# Patient Record
Sex: MALE | Race: WHITE | Employment: FULL TIME | URBAN - METROPOLITAN AREA
[De-identification: names, ages, dates, MRNs, and addresses within clinical notes are randomized per-mention and may not be internally consistent; named-entity substitution may affect disease eponyms.]

---

## 2023-01-23 ENCOUNTER — HOSPITAL ENCOUNTER (OUTPATIENT)
Dept: GENERAL RADIOLOGY | Age: 53
Discharge: HOME | End: 2023-01-23
Payer: COMMERCIAL

## 2023-01-23 ENCOUNTER — HOSPITAL ENCOUNTER (OUTPATIENT)
Age: 53
Discharge: HOME | End: 2023-01-23

## 2023-01-23 DIAGNOSIS — S16.1XXA STRAIN OF NECK MUSCLE, INITIAL ENCOUNTER: ICD-10-CM

## 2023-01-23 DIAGNOSIS — S46.911A STRAIN OF RIGHT SHOULDER, INITIAL ENCOUNTER: ICD-10-CM

## 2023-01-23 DIAGNOSIS — S80.11XA CONTUSION OF KNEE AND LOWER LEG, RIGHT, INITIAL ENCOUNTER: ICD-10-CM

## 2023-01-23 DIAGNOSIS — S50.02XA CONTUSION OF LEFT ELBOW, INITIAL ENCOUNTER: ICD-10-CM

## 2023-01-23 DIAGNOSIS — S80.01XA CONTUSION OF KNEE AND LOWER LEG, RIGHT, INITIAL ENCOUNTER: ICD-10-CM

## 2023-01-23 DIAGNOSIS — S20.212A BRUISED RIB, LEFT, INITIAL ENCOUNTER: ICD-10-CM

## 2023-01-23 PROCEDURE — 73560 X-RAY EXAM OF KNEE 1 OR 2: CPT

## 2023-01-23 PROCEDURE — 73030 X-RAY EXAM OF SHOULDER: CPT

## 2023-01-23 PROCEDURE — 73080 X-RAY EXAM OF ELBOW: CPT

## 2023-02-02 ENCOUNTER — HOSPITAL ENCOUNTER (OUTPATIENT)
Dept: PHYSICAL THERAPY | Age: 53
Setting detail: THERAPIES SERIES
Discharge: HOME | End: 2023-02-02
Payer: COMMERCIAL

## 2023-02-02 PROCEDURE — 97112 NEUROMUSCULAR REEDUCATION: CPT

## 2023-02-02 PROCEDURE — 97162 PT EVAL MOD COMPLEX 30 MIN: CPT

## 2023-02-02 NOTE — FLOWSHEET NOTE
[place]  [address]. and Therapy, [facility], [facility] [place]. [address] [facility], [address]  Phone: [phone]  Fax [phone]    Physical Therapy Daily Treatment Note    Date:  2023    Patient Name:  Alcira Jackson    :  1970  MRN: [id number]  Medical Diagnosis: Strain of muscle, fascia and tendon at neck level, initial encounter [S16. 1XXA]  Contusion of right knee, initial encounter [S80.01XA]  Treatment Diagnosis:  pain, decreased mobility  Onset Date: 23                 Referral Date: 2023           Referring Provider: Rema Dacosta MD   Insurance Provider: [facility]   (8 visits through 23)  Restrictions/Precautions:   none  Plan of care signed (Y/N):  sent  Visit# / total visits:       G-Code (if applicable):          NDI     Time in:   9:45      Timed Treatment: 10  Total Treatment Time:  45  Time out: 10:30  ________________________________________________________________________________________    Pain Level:    /10  SUBJECTIVE:  see eval    OBJECTIVE:     Exercise/Equipment Resistance/Repetitions Other comments                 Postural re-ed 10 min                                                                                                                         Other Therapeutic Activities:      Manual Treatments:         Modalities:      Test/Measurements:  see eval       ASSESSMENT:     see eval    Treatment/Activity Tolerance:   [x]Patient tolerated treatment well [] Patient limited by fatique  []Patient limited by pain [] Patient limited by other medical complications  [] Other:     Goals:    Short term goal 1: Pt will be indep in HEP  Short term goal 2: Pt will decrease pain 25%  Short term goal 3: Pt will increase cervical ROM to WNL  Short term goal 4: Pt will improved seated posture to WNL via plum line  Short term goal 5: Pt will return to light duty at work without limitation    Plan: [x] Continue per plan of care [] Alter current plan (see comments)   [x] Plan of care initiated [] Hold pending MD visit [] Discharge      Plan for Next Session:  Biomechanical correction of problems as they present. Facilitate correct muscle firing patterns and activation with appropriate activities. Progress patient as tolerated. Re-Certification Due Date:         Signature:  Odilon Garcia PT     CPT Code # of charges Time In Time Out Total Time   EVAL:MODERATE (03435) 1 9:45 10:20 35   Re-Eval       MV(46978)        NMR (48031) 1 10:20 10:30 10   Manual (63085)       TA (36389)       Gait (07907)       E-stim (un) (03375)                  E-stim (attended) (12843)       Devota Blizzard       [facility] [employer] (92386)             Other:         Totals   2 9:45 10:30  45

## 2023-02-02 NOTE — THERAPY EVALUATION
[address] [place] [address] and [place] [address] [address], [address]   Phone: [phone]  Fax [phone]     Dear Referring Practitioner: Dr. John Abdullahi,     We had the pleasure of evaluating the following patient for physical therapy services at [facility]. A summary of our findings can be found in the initial assessment below. This includes our plan of care. If you have any questions or concerns regarding these findings, please do not hesitate to contact me at the office phone number. Thank you for the referral.         Physician Signature:_______________________________Date:__________________  By signing above (or electronic signature), therapists plan is approved by physician      CERVICAL, THORACIC SPINE, AND UPPER EXTREMITY PHYSICAL THERAPY EVALUATION        Evaluation Date: 2/2/2023   Patient Name: Jocelyne Khan   YOB: 1970    Medical Diagnosis: Strain of muscle, fascia and tendon at neck level, initial encounter [S16. 1XXA]  Contusion of right knee, initial encounter [S80.01XA]  Treatment Diagnosis:  pain, decreased mobility  Onset Date: 1/23/23    Referral Date: 2/2/2023   Referring Provider: Deidre Richard MD   Insurance Provider: [facility]   (8 visits through 2/26/23)  Restrictions/Precautions:   none    SUBJECTIVE FINDINGS    History of Present Illness:      Pt presents to physical therapy with c/o neck and back pain s/p fall at work. Notes that he fell on a snow covered ramp and hit a small retaining wall and make-shift step. Fall was on Jan 23rd. Notes that he does feel like he is getting better but slowly. Notes that most pain is in the center of his back. Also notes neck pain out to shoulders and lower back pain when he wakes. Denies N&T, weakness. Denies HA, dizziness, nausea, vomiting, changes in vision. Prior to his fall, no issues with back pain.  Currently on restrictions to do sit down job but still needs to get up and move. Turning in his sleep will wake pt up. 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 Denies imaging done on his spine. Pt does note R knee pain as well that came from the fall.   Current Functional Limitations: work, standing or sitting for increased time  PLOF: indep in ADLs, working full-time, riding bikes, walking  Medical History: DM II, anxiety, depression, L TKR, L shoulder scope    Pain    Patient describes pain to be   Patient reports 4/10 pain at present, 7/10 pain at its worst  Worsened by sitting straight up, standing still  Improved by movement, Advil/Tylenol    [address] [facility]   []   Rule does not apply if any of the following are present:     -No Neck pain  -Unstable Vital Signs     -Non Trauma   -Known vertebral disease      -GCS <15   -Acute Paralysis   -Age <16  -Previous C-Spine Surgery     [x]   Actively rotates head to 45 degrees bilat       Posture    [x]   Forward head  [x]   Forward flexed trunk   [x]   Pronounced CT junction    [x]   Increased thoracic kyphosis   [x]   Increased lumbar lordosis   []   Scoliosis   []   Swayback  []   Scapular winging:  []   Other:   anterior humeral head positioning in glenoid    Palpation/Tenderness/Visual Inspection      Patient reported tenderness with palpation  [x]   Yes   []   No   []   NT  Location:  thoracic paraspinals, thoracic and lumbar SPs  PT notes increased muscle tone   []   Yes   [x]   No   []   NT  Location:   Overhead mobility: forward head, decreased upper thoracic extension, scapular dyskinesia     Special Tests- Cervical and Thoracic   Special Test Findings Comments   Sharp Teena [x]Neg   []Pos   []NT    Alar ligament/ C2 spinous kick test [x]Neg   []Pos   []NT    Vertebral Artery/Positional Provocative Testing [x]Neg   []Pos R   []Pos L   []NT    Dizziness, Nausea, Double Vision [x]Neg   []Pos R   []Pos L Spurling's/Foraminal []Neg   []Pos R   []Pos L   [x]NT    Cranial Nerves [x]Neg   []Pos R   []Pos L    Cervical Distraction [x]Relief noted   []No relief noted   []NT    Cervical Compression  []Neg   []Pos   [x]NT    Median nerve tension test []Neg   []Pos R   []Pos L   [x]NT    Radial nerve tension test []Neg   []Pos R   []Pos L   [x]NT    Ulnar nerve tension test []Neg   []Pos R   []Pos L   [x]NT        Range of Motion/Strength Testing/Myotomes    [x] All ROM and strength WNL except as marked below  *denotes pain  Range Tested AROM Strength (MMT)/Myotomes    Left (or  neutral)  Right Left (or neutral) Right   Cervical Flex (C1-2) WFL      Cervical Ext 75%      Cervical SB (C3) 50% 50%     Cervical Rot 50% 75%     Shoulder Flex   4* 4*   Shoulder Abd (C5)   5 5   Shoulder IR   5 5   Shoulder ER    5 5   Elbow flex (C6)   5 5   Elbow ext (C7)   5 5   Wrist ext (C6)       Wrist flex (C7)       Thumb Ext (C8)         UE Dermatomes     [x] All dermatomes WNL except as marked below   Dermatomes  Left  Right Comments   Posterior Head (C2)      Lateral Upper Neck (C3)      Supraclavicular (C4)      Lateral Upper Arm (C5)      Lateral Forearm/1st (C6)      3rd digit (C7)      5th digit (C8)      Medial Arm (T1)        Reflexes      [x] All reflexes WNL except as marked below  Reflex Left Right   Biceps (C5, C6)     Brachioradialis (C6)     Triceps (C7)     Hoffmans - -   Clonus - -     Scapula Strength     [x] All strength WNL except as marked below   Scapula Left Right Comments   Middle Trapezius 4- 4-    Lower Trapezius 3- 3-    Rhomboid 4 4    Serratus Anterior   3+ 3+    Latissimus Dorsi        Flexibility   [x] All flexibility WNL except as marked below     Muscle Findings   Pectoralis Minor [] WNL   [x] Tight    Pec Major - Lower [] WNL   [x] Tight   Pec Major - Upper [] WNL   [x] Tight   Latissimus Dorsi  [] WNL   [x] Tight   Levator Scapula [] WNL   [] Tight   Suboccipitals [] WNL   [] Tight   Upper Trapezius [] WNL   [x] Tight   Scalenes [] WNL   [] Tight     Joint Mobility/Accessory Movements (cervical, UE, rib)    1st rib - WFL B  Thoracic - hypomobile with PA joint play T1-4  CTJ - hypomobile with PA and rotational joint play  Hip - hypomobility noted with inferior and lateral joint play RLE  Lumbar - assess nv    ASSESSMENT  Pt is a 45 y/o presenting to physical therapy with c/o cervical, thoracic, lumbar and R knee pains. Through evaluation and examination, identified findings consistent with altered joint mechanics of the CTJ, upper thoracic and lumbar spine as well as the R hip. Pt also demonstrated poor movement patterns of overhead mobility and cervical ROM. PT recommending skilled physical therapy in order to address goals and return to function. Body Structures, Functions, Activity Limitations Requiring Skilled Therapeutic Intervention: Decreased functional mobility ,Decreased ADL status,Decreased ROM,Decreased body mechanics,Decreased strength,Increased pain     Statement of Medical Necessity: Physical Therapy is both indicated and medically necessary as outlined in the POC to increase the likelihood of meeting the functionally related goals stated below.       Eval Complexity:    Decision Making: MOD Complexity     PLAN OF CARE    Frequency: 2x/wk for 4 weeks  Current Treatment Recommendations: Therapeutic exercise, therapeutic activity, manual therapy, gait training, neuromuscular re-education     FUNCTIONAL OUTCOME MEASURE  NDI 23/50      GOALS  Short term goal 1: Pt will be indep in HEP  Short term goal 2: Pt will decrease pain 25%  Short term goal 3: Pt will increase cervical ROM to WNL  Short term goal 4: Pt will improved seated posture to WNL via plum line  Short term goal 5: Pt will return to light duty at work without limitation      Time In: 9:45  Time Out: 10:30  Timed Code Treatment Minutes: 10 minutes            Total Treatment Time:  45 minutes    Carolann Favre, PT DPT license # [id number]

## 2023-02-06 ENCOUNTER — HOSPITAL ENCOUNTER (OUTPATIENT)
Dept: PHYSICAL THERAPY | Age: 53
Setting detail: THERAPIES SERIES
Discharge: HOME | End: 2023-02-06
Payer: COMMERCIAL

## 2023-02-06 NOTE — PROGRESS NOTES
Physical Therapy  Cancellation/No-show Note  Patient Name:  Lorelei Cid  :  1970   Date:  2023  Cancels to date: 1  No-shows to date: 0    For today's appointment patient:  [x] Cancelled  [] Rescheduled appointment  [] No-show     Reason given by patient:  [x] Patient ill  [] Conflicting appointment  [] No transportation    [] Conflict with work  [] No reason given  [] Other:     Comments:      Electronically signed by:  Susan Gregory PT

## 2023-02-08 ENCOUNTER — HOSPITAL ENCOUNTER (OUTPATIENT)
Age: 53
Discharge: HOME | End: 2023-02-08

## 2023-02-08 ENCOUNTER — HOSPITAL ENCOUNTER (OUTPATIENT)
Dept: GENERAL RADIOLOGY | Age: 53
Discharge: HOME | End: 2023-02-08
Payer: COMMERCIAL

## 2023-02-08 DIAGNOSIS — S80.11XA CONTUSION OF KNEE AND LOWER LEG, RIGHT, INITIAL ENCOUNTER: ICD-10-CM

## 2023-02-08 DIAGNOSIS — S80.01XA CONTUSION OF KNEE AND LOWER LEG, RIGHT, INITIAL ENCOUNTER: ICD-10-CM

## 2023-02-08 PROCEDURE — 73564 X-RAY EXAM KNEE 4 OR MORE: CPT

## 2023-02-13 ENCOUNTER — HOSPITAL ENCOUNTER (OUTPATIENT)
Dept: PHYSICAL THERAPY | Age: 53
Setting detail: THERAPIES SERIES
Discharge: HOME | End: 2023-02-13
Payer: COMMERCIAL

## 2023-02-13 PROCEDURE — 97140 MANUAL THERAPY 1/> REGIONS: CPT

## 2023-02-13 PROCEDURE — 97110 THERAPEUTIC EXERCISES: CPT

## 2023-02-13 NOTE — FLOWSHEET NOTE
[facility], [facility], [address], [address]  Phone: [phone]  Fax [phone]    Physical Therapy Daily Treatment Note    Date:  2023    Patient Name:  Maritza Gallegos    :  1970  MRN: [id number]  Medical Diagnosis: Strain of muscle, fascia and tendon at neck level, initial encounter [S16. 1XXA]  Contusion of right knee, initial encounter [S80.01XA]  Treatment Diagnosis:  pain, decreased mobility  Onset Date: 23                 Referral Date: 2023           Referring Provider: Pushpa Bernabe MD   Insurance Provider: [facility]   (8 visits through 23)  Restrictions/Precautions:   none  Plan of care signed (Y/N):  sent  Visit# / total visits:       G-Code (if applicable):          NDI     Time in:   7:00     Timed Treatment: 42  Total Treatment Time:  42  Time out: 7:42  ________________________________________________________________________________________    Pain Level:    /10  SUBJECTIVE:  Pt reports that he is stiff. Notes that he still feels like it is getting better. OBJECTIVE: [id number]    Exercise/Equipment Resistance/Repetitions Other comments                 Postural re-ed    SNF 10x10\" hold    Supine cervical rotation X10 B    Supine scap squeeze 10x5\" hold    Supine S HABD x15 Blue TB   Foam roll protocol 6 min                                                                                      Other Therapeutic Activities:      Manual Treatments:    cervical distraction. Supine thoracic PA mobilization grade IV. Supine CTJ rotation mobilization grade IV. Suboccipital release. STM to B upper quarter. Modalities:      Test/Measurements:  see eval       ASSESSMENT:     Pt tolerated session well. Cervical ROM WNL following session. Initiated HEP without issue.      Treatment/Activity Tolerance:   [x]Patient tolerated treatment well [] Patient limited by fatique  []Patient limited by pain [] Patient limited by other medical complications  [] Other:     Goals:    Short term goal 1: Pt will be indep in HEP  Short term goal 2: Pt will decrease pain 25%  Short term goal 3: Pt will increase cervical ROM to WNL  Short term goal 4: Pt will improved seated posture to WNL via plum line  Short term goal 5: Pt will return to light duty at work without limitation    Plan: [x] Continue per plan of care [] Alter current plan (see comments)   [] Plan of care initiated [] Hold pending MD visit [] Discharge      Plan for Next Session:  Biomechanical correction of problems as they present. Facilitate correct muscle firing patterns and activation with appropriate activities. Progress patient as tolerated. Re-Certification Due Date:         Signature:  [place] PT     CPT Code # of charges Time In Time Out Total Time   EVAL:MODERATE (99163)       Re-Eval       LO(63785)  2 7:16 7:42 26   NMR (77763)       Manual (10888) 1 7:00 7:16 16   TA (39940)       Gait (23800)       E-stim (un) (47822)                  E-stim (attended) (93805)       Mago Greenfield       [facility] [employer] (07229)             Other:         Totals   3 7:00 7:42  42

## 2023-02-15 ENCOUNTER — HOSPITAL ENCOUNTER (OUTPATIENT)
Dept: PHYSICAL THERAPY | Age: 53
Setting detail: THERAPIES SERIES
Discharge: HOME | End: 2023-02-15
Payer: COMMERCIAL

## 2023-02-15 PROCEDURE — 97140 MANUAL THERAPY 1/> REGIONS: CPT

## 2023-02-15 PROCEDURE — 97110 THERAPEUTIC EXERCISES: CPT

## 2023-02-15 NOTE — FLOWSHEET NOTE
[facility], [facility], [address], [address]  Phone: [phone]  Fax [phone]    Physical Therapy Daily Treatment Note    Date:  2/15/2023    Patient Name:  Argenis Wright    :  1970  MRN: [id number]  Medical Diagnosis: Strain of muscle, fascia and tendon at neck level, initial encounter [S16. 1XXA]  Contusion of right knee, initial encounter [S80.01XA]  Treatment Diagnosis:  pain, decreased mobility  Onset Date: 23                 Referral Date: 2023           Referring Provider: Ana Brown MD   Insurance Provider: [facility]   (8 visits through 23)  Restrictions/Precautions:   none  Plan of care signed (Y/N):  sent  Visit# / total visits:  3/8     G-Code (if applicable):          NDI     Time in:   1:00     Timed Treatment: 45  Total Treatment Time:  45  Time out: 1:45  ________________________________________________________________________________________    Pain Level:    /10  SUBJECTIVE:  Pt reports that he is feeling pretty good. Notes that he is having an MRI on his knee tomorrow. OBJECTIVE: [id number]    Exercise/Equipment Resistance/Repetitions Other comments                 Postural re-ed    SNF 10x10\" hold    Supine cervical rotation X10 B    Supine scap squeeze 10x5\" hold    Supine S HABD x15 Blue TB   Foam roll protocol 6 min    Prone T 5x10\"    Upper trap wall slide    x10                                                                      Other Therapeutic Activities:      Manual Treatments:    cervical distraction. Supine thoracic PA mobilization grade IV. Supine CTJ rotation mobilization grade IV. Suboccipital release. STM to B upper quarter. Modalities:      Test/Measurements:  see eval       ASSESSMENT:     Pt tolerated session well. Cervical ROM WNL following session.      Treatment/Activity Tolerance:   [x]Patient tolerated treatment well [] Patient limited by isis  []Patient limited by pain [] Patient limited by other medical complications  [] Other:     Goals:    Short term goal 1: Pt will be indep in HEP  Short term goal 2: Pt will decrease pain 25%  Short term goal 3: Pt will increase cervical ROM to WNL  Short term goal 4: Pt will improved seated posture to WNL via plum line  Short term goal 5: Pt will return to light duty at work without limitation    Plan: [x] Continue per plan of care [] Alter current plan (see comments)   [] Plan of care initiated [] Hold pending MD visit [] Discharge      Plan for Next Session:  Biomechanical correction of problems as they present. Facilitate correct muscle firing patterns and activation with appropriate activities. Progress patient as tolerated. Re-Certification Due Date:         Signature:  Jn Hunter PT     CPT Code # of charges Time In Time Out Total Time   EVAL:MODERATE (35431)       Re-Eval       XF(84982)  2 1:15 1:45 30   NMR (79014)       Manual (09593) 1 1:00 1:15 15   TA (59751)       Gait (87424)       E-stim (un) (17925)                  E-stim (attended) (83698)       Abby Moulding       [employer] (80593)             Other:         Totals   3 1:00 1:45  45

## 2023-02-16 ENCOUNTER — HOSPITAL ENCOUNTER (OUTPATIENT)
Dept: MRI IMAGING | Age: 53
Discharge: HOME | End: 2023-02-16
Payer: COMMERCIAL

## 2023-02-16 DIAGNOSIS — S46.911A STRAIN OF RIGHT ELBOW, INITIAL ENCOUNTER: ICD-10-CM

## 2023-02-16 DIAGNOSIS — S20.212A CONTUSION OF LEFT FRONT WALL OF THORAX, INITIAL ENCOUNTER: ICD-10-CM

## 2023-02-16 DIAGNOSIS — S80.01XA CONTUSION OF RIGHT KNEE, INITIAL ENCOUNTER: ICD-10-CM

## 2023-02-16 DIAGNOSIS — S50.02XA CONTUSION OF LEFT ELBOW, INITIAL ENCOUNTER: ICD-10-CM

## 2023-02-16 DIAGNOSIS — S16.1XXA STRAIN OF STERNOCLEIDOMASTOID MUSCLE, INITIAL ENCOUNTER: ICD-10-CM

## 2023-02-16 PROCEDURE — 73721 MRI JNT OF LWR EXTRE W/O DYE: CPT

## 2023-02-20 ENCOUNTER — HOSPITAL ENCOUNTER (OUTPATIENT)
Dept: PHYSICAL THERAPY | Age: 53
Setting detail: THERAPIES SERIES
Discharge: HOME | End: 2023-02-20
Payer: COMMERCIAL

## 2023-02-20 PROCEDURE — 97140 MANUAL THERAPY 1/> REGIONS: CPT

## 2023-02-20 PROCEDURE — 97110 THERAPEUTIC EXERCISES: CPT

## 2023-02-20 NOTE — FLOWSHEET NOTE
[facility] Therapy, [facility], [address], [address]  Phone: [phone]  Fax [phone]    Physical Therapy Daily Treatment Note    Date:  2023    Patient Name:  Mary Hamilton    :  1970  MRN: [id number]  Medical Diagnosis: Strain of muscle, fascia and tendon at neck level, initial encounter [S16. 1XXA]  Contusion of right knee, initial encounter [S80.01XA]  Treatment Diagnosis:  pain, decreased mobility  Onset Date: 23                 Referral Date: 2023           Referring Provider: Frankie Alberts MD   Insurance Provider: [facility]   (8 visits through 23)  Restrictions/Precautions:   none  Plan of care signed (Y/N):  sent  Visit# / total visits:       G-Code (if applicable):          NDI     Time in:   1:00     Timed Treatment: 45  Total Treatment Time:  45  Time out: 1:45  ________________________________________________________________________________________    Pain Level:    /10  SUBJECTIVE:  Pt reports that he is feeling pretty good. Notes that he is having an MRI on his knee tomorrow. OBJECTIVE: [id number]    Exercise/Equipment Resistance/Repetitions Other comments                 Postural re-ed    SNF 10x10\" hold Manual resistance   Supine cervical rotation X10 B    Supine scap squeeze     Supine S HABD x15 Blue TB   Foam roll protocol 6 min    Prone T 5x10\"    Upper trap wall slide    x10                                                                      Other Therapeutic Activities:      Manual Treatments:    cervical distraction. Supine thoracic PA mobilization grade IV. Supine CTJ rotation mobilization grade IV. Suboccipital release. STM to B upper quarter. Modalities:      Test/Measurements:  see eval       ASSESSMENT:     Pt tolerated session well. Cervical ROM WNL following session.      Treatment/Activity Tolerance:   [x]Patient tolerated treatment well [] Patient limited by isis  []Patient limited by pain [] Patient limited by other medical complications  [] Other:     Goals:    Short term goal 1: Pt will be indep in HEP  Short term goal 2: Pt will decrease pain 25%  Short term goal 3: Pt will increase cervical ROM to WNL  Short term goal 4: Pt will improved seated posture to WNL via plum line  Short term goal 5: Pt will return to light duty at work without limitation    Plan: [x] Continue per plan of care [] Alter current plan (see comments)   [] Plan of care initiated [] Hold pending MD visit [] Discharge      Plan for Next Session:  Biomechanical correction of problems as they present. Facilitate correct muscle firing patterns and activation with appropriate activities. Progress patient as tolerated. Re-Certification Due Date:         Signature:  Corbin Leblanc PT     CPT Code # of charges Time In Time Out Total Time   EVAL:MODERATE (53058)       Re-Eval       US(06359)  2 11:00 11:30 30   NMR (98285)       Manual (68725) 1 10:45 11:00 15   TA (61757)       Gait (86924)       E-stim (un) (03424)                  E-stim (attended) (44262)       Deuce Kyle       [facility] [employer] (97343)             Other:         Totals   3 10:45 11:30  45

## 2023-02-22 ENCOUNTER — HOSPITAL ENCOUNTER (OUTPATIENT)
Dept: PHYSICAL THERAPY | Age: 53
Setting detail: THERAPIES SERIES
Discharge: HOME | End: 2023-02-22
Payer: COMMERCIAL

## 2023-02-22 PROCEDURE — 97110 THERAPEUTIC EXERCISES: CPT

## 2023-02-22 PROCEDURE — 97140 MANUAL THERAPY 1/> REGIONS: CPT

## 2023-02-22 NOTE — FLOWSHEET NOTE
[facility], [facility], [address], [address]  Phone: [phone]  Fax [phone]    Physical Therapy Daily Treatment Note    Date:  2023    Patient Name:  Derrell Roberts    :  1970  MRN: [id number]  Medical Diagnosis: Strain of muscle, fascia and tendon at neck level, initial encounter [S16. 1XXA]  Contusion of right knee, initial encounter [S80.01XA]  Treatment Diagnosis:  pain, decreased mobility  Onset Date: 23                 Referral Date: 2023           Referring Provider: María Diggs MD   Insurance Provider: [facility]   (8 visits through 23)  Restrictions/Precautions:   none  Plan of care signed (Y/N):  sent  Visit# / total visits:       G-Code (if applicable):          NDI     Time in:   1:00     Timed Treatment: 45  Total Treatment Time:  45  Time out: 1:45  ________________________________________________________________________________________    Pain Level:    /10  SUBJECTIVE:  Pt reports that he is feeling pretty good. Notes that he is having an MRI on his knee tomorrow. OBJECTIVE: [id number]    Exercise/Equipment Resistance/Repetitions Other comments                 Postural re-ed    SNF    Lift from hands 10x10\" hold  x10 Manual resistance   Supine cervical rotation X10 B    Supine scap squeeze     Supine S HABD x15 Blue TB   Foam roll protocol 6 min    Prone T    W 5x10\"  5x10\"    Upper trap wall slide    x10                                                                      Other Therapeutic Activities:      Manual Treatments:    cervical distraction. Supine thoracic PA mobilization grade IV. Supine CTJ rotation mobilization grade IV. Suboccipital release. STM to B upper quarter. Modalities:      Test/Measurements:  see eval       ASSESSMENT:     Pt tolerated session well. Cervical ROM WNL following session.      Treatment/Activity Tolerance:   [x]Patient tolerated treatment well [] Patient limited by fatique  []Patient limited by pain [] Patient limited by other medical complications  [] Other:     Goals:    Short term goal 1: Pt will be indep in HEP  Short term goal 2: Pt will decrease pain 25%  Short term goal 3: Pt will increase cervical ROM to WNL  Short term goal 4: Pt will improved seated posture to WNL via plum line  Short term goal 5: Pt will return to light duty at work without limitation    Plan: [x] Continue per plan of care [] Alter current plan (see comments)   [] Plan of care initiated [] Hold pending MD visit [] Discharge      Plan for Next Session:  Biomechanical correction of problems as they present. Facilitate correct muscle firing patterns and activation with appropriate activities. Progress patient as tolerated. Re-Certification Due Date:         Signature:  Michelle Santos PT     CPT Code # of charges Time In Time Out Total Time   EVAL:MODERATE (37011)       Re-Eval       FV(49862)  2 1:15 1:45 30   NMR (76768)       Manual (59816) 1 1:00 1:15 15   TA (10187)       Gait (57033)       E-stim (un) (12928)                  E-stim (attended) (60667)       Camille Prudent       [employer] (47665)             Other:         Totals   3 1:00 1:45  45

## 2023-02-27 ENCOUNTER — HOSPITAL ENCOUNTER (OUTPATIENT)
Dept: PHYSICAL THERAPY | Age: 53
Setting detail: THERAPIES SERIES
Discharge: HOME | End: 2023-02-27
Payer: COMMERCIAL

## 2023-02-27 PROCEDURE — 97140 MANUAL THERAPY 1/> REGIONS: CPT

## 2023-02-27 PROCEDURE — 97110 THERAPEUTIC EXERCISES: CPT

## 2023-02-27 NOTE — FLOWSHEET NOTE
[facility], [facility], [address], [address]  Phone: [phone]  Fax [phone]    Physical Therapy Daily Treatment Note    Date:  2023    Patient Name:  Rita Centeno    :  1970  MRN: [id number]  Medical Diagnosis: Strain of muscle, fascia and tendon at neck level, initial encounter [S16. 1XXA]  Contusion of right knee, initial encounter [S80.01XA]  Treatment Diagnosis:  pain, decreased mobility  Onset Date: 23                 Referral Date: 2023           Referring Provider: Gustabo Bustamante MD   Insurance Provider: [facility]   (8 visits through 3/15/23)  Restrictions/Precautions:   none  Plan of care signed (Y/N):  sent  Visit# / total visits:       G-Code (if applicable):          NDI     Time in:   7:00    Timed Treatment: 45  Total Treatment Time:  45  Time out: 7:45  ________________________________________________________________________________________    Pain Level:    /10  SUBJECTIVE:  Pt reports that he is feeling pretty good. OBJECTIVE: [id number]    Exercise/Equipment Resistance/Repetitions Other comments                 Postural re-ed    SNF    Lift from hands 10x10\" hold  x10 Manual resistance   Supine cervical rotation X10 B    Supine scap squeeze     Standing S HABD x15 Red TB   Foam roll protocol 6 min    Prone T    W 5x10\"  5x10\"    Upper trap wall slide    x10    Pivot prone on wall 5x10\"                                                              Other Therapeutic Activities:      Manual Treatments:    cervical distraction. Supine thoracic PA mobilization grade IV. Supine CTJ rotation mobilization grade IV. Suboccipital release. STM to B upper quarter. Modalities:      Test/Measurements:  see eval       ASSESSMENT:     Pt tolerated session well. Cervical ROM WNL following session.      Treatment/Activity Tolerance:   [x]Patient tolerated treatment well [] Patient limited by isis  []Patient limited by pain [] Patient limited by other medical complications  [] Other:     Goals:    Short term goal 1: Pt will be indep in HEP  Short term goal 2: Pt will decrease pain 25%  Short term goal 3: Pt will increase cervical ROM to WNL  Short term goal 4: Pt will improved seated posture to WNL via plum line  Short term goal 5: Pt will return to light duty at work without limitation    Plan: [x] Continue per plan of care [] Alter current plan (see comments)   [] Plan of care initiated [] Hold pending MD visit [] Discharge      Plan for Next Session:  Biomechanical correction of problems as they present. Facilitate correct muscle firing patterns and activation with appropriate activities. Progress patient as tolerated. Re-Certification Due Date:         Signature:  Susan Gregory PT     CPT Code # of charges Time In Time Out Total Time   EVAL:MODERATE (49736)       Re-Eval       GI(83878)  2 7:15 7:45 30   NMR (75110)       Manual (02139) 1 7:00 7:15 15   TA (12563)       Gait (56646)       E-stim (un) (55210)                  E-stim (attended) (80161)       Darilyn Hashimoto       [facility] (84949)             Other:         Totals   3 7:00 7:45  45

## 2023-02-28 ENCOUNTER — OFFICE VISIT (OUTPATIENT)
Dept: ORTHOPEDIC SURGERY | Age: 53
End: 2023-02-28

## 2023-02-28 ENCOUNTER — TELEPHONE (OUTPATIENT)
Dept: ORTHOPEDIC SURGERY | Age: 53
End: 2023-02-28

## 2023-02-28 VITALS — HEIGHT: 67 IN | BODY MASS INDEX: 43.95 KG/M2 | WEIGHT: 280 LBS

## 2023-02-28 DIAGNOSIS — M70.41 PREPATELLAR BURSITIS OF RIGHT KNEE: Primary | ICD-10-CM

## 2023-02-28 DIAGNOSIS — S83.91XA SPRAIN OF RIGHT KNEE, UNSPECIFIED LIGAMENT, INITIAL ENCOUNTER: ICD-10-CM

## 2023-02-28 RX ORDER — MELOXICAM 15 MG/1
15 TABLET ORAL DAILY PRN
Qty: 30 TABLET | Refills: 0 | Status: SHIPPED | OUTPATIENT
Start: 2023-02-28

## 2023-02-28 RX ORDER — CITALOPRAM 40 MG/1
40 TABLET ORAL DAILY
COMMUNITY

## 2023-02-28 NOTE — PROGRESS NOTES
Dr Jn Ferris      Date /Time 2/28/2023       1:59 PM EST  Name Nikunj Leach             1970   Location  [address] [place] [address]  MRN [id number]                Chief Complaint   Patient presents with    Knee Pain     Right knee injury date of injury 1/23/2023        History of Present Illness  Nikunj Leach is a 46 y.o. male who presents with  right knee pain, . Sent in consultation by No primary care provider on file., . Injury Mechanism:  twisting injury. Worker's Comp. & legal issues:   claim accepted and none. Previous Treatments: Ice, Heat, and NSAIDs    Patient presents to the office today for a new problem. Patient here with a chief complaint of right knee pain. Patient's right knee became painful on 1/23/2023. 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 He has been seen a Workmen's Compensation doctor up until now. His pain is more over the anterior medial aspect of the knee. He does complain of swelling. Patient has had a previous arthroscopic partial meniscectomy in 2014    Past History  No past medical history on file. No past surgical history on file. Social History     Tobacco Use    Smoking status: Never     Passive exposure: Never    Smokeless tobacco: Never   Substance Use Topics    Alcohol use: Not on file      Current Outpatient Medications on File Prior to Visit   Medication Sig Dispense Refill    citalopram (CELEXA) 40 MG tablet Take 40 mg by mouth daily       No current facility-administered medications on file prior to visit. ASCVD 10-YEAR RISK SCORE  The ASCVD Risk score (Mahendra DK, et al., 2019) failed to calculate for the following reasons: The systolic blood pressure is missing    Cannot find a previous HDL lab    Cannot find a previous total cholesterol lab     Review of Systems  10-point ROS is negative other than HPI.     Physical Exam  Based off 1997 Exam Criteria  Ht 5' 7\" (1.702 m)   Wt 280 lb (127 kg)   BMI 43.85 kg/m²      Constitutional:       General: He is not in acute distress. Appearance: Normal appearance. Cardiovascular:      Rate and Rhythm: Normal rate and regular rhythm. Pulses: Normal pulses. Pulmonary:      Effort: Pulmonary effort is normal. No respiratory distress. Neurological:      Mental Status: He is alert and oriented to person, place, and time. Mental status is at baseline. Skin: Mean, dry, intact. No open sores  Lymphatics: No palpable lymph nodes    Musculoskeletal:  Gait:  altered  Lumbar spine: There is no swelling, warmth, or erythema. Range of motion is within normal limits. There is no paraspinal or spinous process tenderness. . The distal neurovascular exam is grossly intact and symmetric. Aditya Hip: Examination of the right and left hip reveals intact skin. The patient demonstrates full painless range of motion with regards to flexion, abduction, internal and external rotation. There is no tenderness about the greater trochanter. R Knee: Physical exam of the knee demonstrates painful range of motion 0-110. Mild tenderness over the medial joint line. No gross instability to either varus or valgus stress test.  Swelling within the prepatellar bursa. Negative knee joint effusion  L Knee: Examination of the left knee reveals intact skin. There is no focal tenderness. The patient demonstrates full painless range of motion with regard to flexion and extension. Imaging  Right Knee: [address] [facility] [facility]  Radiographs: X-rays were ordered today and reviewed of the right knee. 4 views. Standing AP, standing AP flexed, lateral, and skyline views. They demonstrate no evidence of fractures or dislocations.   Mild thinning medial joint surface      MRI:   EXAMINATION:   MRI OF THE RIGHT KNEE WITHOUT CONTRAST, 2/16/2023 8:08 am       TECHNIQUE:   Multiplanar multisequence MRI of the right knee was performed without the   administration of intravenous contrast.       COMPARISON: Right knee radiographs from 02/08/2023       HISTORY:   ORDERING SYSTEM PROVIDED HISTORY: Contusion of left elbow, initial encounter   TECHNOLOGIST PROVIDED HISTORY:   Reason for Exam: RT knee pain resulting from fall 1/23/23   SYSTOC ORDER ID:[id number]       63-year-old male who complains of right knee pain;       FINDINGS:   MENISCI: Lateral meniscus demonstrates normal morphology and signal   characteristics. No lateral meniscus tear. Complex tearing and volume loss in the posterior horn and body of the medial   meniscus with outward extrusion. CRUCIATE LIGAMENTS: Anterior and posterior cruciate ligaments appear intact. EXTENSOR MECHANISM: Distal quadriceps tendon, patellar tendon, and patellar   retinacula appear intact. LATERAL COLLATERAL LIGAMENT COMPLEX: Popliteus muscle/tendon, iliotibial   band, lateral collateral ligament, and biceps femoris appear intact. MEDIAL COLLATERAL LIGAMENT COMPLEX: Medial collateral ligament complex   appears intact. KNEE JOINT: No sizable joint effusion. Diffuse grade 3 medial compartment chondromalacia. Grade 2 lateral compartment chondromalacia. Subcortical cystic changes and reactive marrow edema at the outer medial   femoral condyle. Osseous alignment is normal       No acute fracture or dislocation. Grade 2-3 mild-to-moderate patellofemoral chondromalacia. BONE MARROW: Bone marrow signal intensity otherwise grossly unremarkable. SOFT TISSUES: Region of heterogeneous high proton density signal in the   subcutaneous fat anterior to the patellar tendon measuring 4.0 x 1.7 x 3.7 cm   which could represent a complex prepatellar bursitis, focal nodular   synovitis, or liquefying hematoma. Impression   1. Complex tearing and volume loss in the posterior horn and body of the   medial meniscus with outward extrusion.    2. 4.0 x 1.7 x 3.7 cm region of heterogeneous proton density signal in the subcutaneous fat anterior to the patellar tendon which could represent a   complex prepatellar bursitis, focal nodular synovitis or liquefying hematoma. Follow-up is recommended to document resolution. 3. Moderate medial compartment chondromalacia. Mild lateral compartment   chondromalacia. Mild-to-moderate patellofemoral chondromalacia. 4. No lateral meniscus tear. No acute ligamentous injury. Personal review of MRI does demonstrate a complex volume loss of the posterior horn of the medial meniscus. This is from his previous arthroscopic surgery in 2014. Also significant fluid collection within the prepatellar bursa consistent with prepatellar bursitis. He does have moderate chondromalacia medial compartment and mild chondromalacia lateral and patellofemoral compartments. Procedure:  No orders of the defined types were placed in this encounter. Assessment and Plan  Jake was seen today for knee pain. Diagnoses and all orders for this visit:    Prepatellar bursitis of right knee    Sprain of right knee, unspecified ligament, initial encounter      I do not believe patient has any internal pathology related to his Workmen's Compensation injury. He has very little to no pain on the medial joint line. He has a negative knee joint effusion. He is mostly suffering with prepatellar bursitis. I have requested a knee sleeve and placed him on Mobic. I discussed with Alyssa Castellanos that his history, symptoms, signs, and imaging are most consistent with  prepatellar bursitis no signs of internal derangement related to Workmen's Compensation case . We reviewed the natural history of these conditions and treatment options ranging from conservative measures (rest, icing, activity modification, physical therapy, pain meds, cortisone injection) to surgical options.      In terms of treatment, I recommended continuing with rest, icing, avoidance of painful activities, NSAIDs or pain meds as tolerated, and physical therapy. If these are not effective, cortisone injection can be considered. We discussed surgical options as well, should conservative measures fail. Electronically signed by Tete Abernathy MD on 2/28/2023 at 1:59 PM  This dictation was generated by voice recognition computer software. Although all attempts are made to edit the dictation for accuracy, there may be errors in the transcription that are not intended.

## 2023-03-01 ENCOUNTER — HOSPITAL ENCOUNTER (OUTPATIENT)
Dept: PHYSICAL THERAPY | Age: 53
Setting detail: THERAPIES SERIES
Discharge: HOME | End: 2023-03-01
Payer: COMMERCIAL

## 2023-03-01 PROCEDURE — 97110 THERAPEUTIC EXERCISES: CPT

## 2023-03-01 PROCEDURE — 97140 MANUAL THERAPY 1/> REGIONS: CPT

## 2023-03-01 NOTE — FLOWSHEET NOTE
[facility], [facility], [address], [address]  Phone: [phone]  Fax [phone]    Physical Therapy Daily Treatment Note    Date:  3/1/2023    Patient Name:  Lorena Mobley    :  1970  MRN: [id number]  Medical Diagnosis: Strain of muscle, fascia and tendon at neck level, initial encounter [S16. 1XXA]  Contusion of right knee, initial encounter [S80.01XA]  Treatment Diagnosis:  pain, decreased mobility  Onset Date: 23                 Referral Date: 2023           Referring Provider: Jake Valadez MD   Insurance Provider: [facility]   (8 visits through 3/15/23)  Restrictions/Precautions:   none  Plan of care signed (Y/N):  sent  Visit# / total visits:       G-Code (if applicable):          NDI     Time in:   2:30    Timed Treatment: 45  Total Treatment Time:  45  Time out: 3:15  ________________________________________________________________________________________    Pain Level:    /10  SUBJECTIVE:  Pt reports that he is feeling pretty good. OBJECTIVE: [id number]    Exercise/Equipment Resistance/Repetitions Other comments                 Postural re-ed    SNF    Lift from hands 10x10\" hold  x10 Manual resistance   Supine cervical rotation X10 B    Supine scap squeeze     Standing S HABD x15 Red TB   Foam roll protocol 6 min    Prone T    W 5x10\"  5x10\"    Upper trap wall slide    x10    Pivot prone on wall 5x10\"                                                              Other Therapeutic Activities:      Manual Treatments:    cervical distraction. Supine thoracic PA mobilization grade IV. Supine CTJ rotation mobilization grade IV. Suboccipital release. STM to B upper quarter. Modalities:      Test/Measurements:  see eval       ASSESSMENT:     Pt tolerated session well. Cervical ROM WNL following session.      Treatment/Activity Tolerance:   [x]Patient tolerated treatment well [] Patient limited by isis  []Patient limited by pain [] Patient limited by other medical complications  [] Other:     Goals:    Short term goal 1: Pt will be indep in HEP  Short term goal 2: Pt will decrease pain 25%  Short term goal 3: Pt will increase cervical ROM to WNL  Short term goal 4: Pt will improved seated posture to WNL via plum line  Short term goal 5: Pt will return to light duty at work without limitation    Plan: [x] Continue per plan of care [] Alter current plan (see comments)   [] Plan of care initiated [] Hold pending MD visit [] Discharge      Plan for Next Session:  Biomechanical correction of problems as they present. Facilitate correct muscle firing patterns and activation with appropriate activities. Progress patient as tolerated. Re-Certification Due Date:         Signature:  Lio Hutton PT     CPT Code # of charges Time In Time Out Total Time   EVAL:MODERATE (28518)       Re-Eval       OZ(63262)  2 2:50 3:15 25   NMR (76383)       Manual (30029) 1 2:30 2:50 20   TA (09433)       Gait (58428)       E-stim (un) (70437)                  E-stim (attended) (15498)       [facility] (05508)             Other:         Totals   3 2:30 3:15  45

## 2023-03-06 ENCOUNTER — APPOINTMENT (OUTPATIENT)
Dept: PHYSICAL THERAPY | Age: 53
End: 2023-03-06
Payer: COMMERCIAL

## 2023-03-08 ENCOUNTER — HOSPITAL ENCOUNTER (OUTPATIENT)
Dept: PHYSICAL THERAPY | Age: 53
Setting detail: THERAPIES SERIES
Discharge: HOME | End: 2023-03-08
Payer: COMMERCIAL

## 2023-03-08 NOTE — PROGRESS NOTES
Physical Therapy  Cancellation/No-show Note  Patient Name:  He Hernandes  :  1970   Date:  3/8/2023  Cancels to date: 2  No-shows to date: 0    For today's appointment patient:  [x] Cancelled  [] Rescheduled appointment  [] No-show     Reason given by patient:  [] Patient ill  [] Conflicting appointment  [] No transportation    [] Conflict with work  [x] No reason given  [] Other:     Comments:      Electronically signed by:  Julienne Geronimo PT

## 2023-08-04 ENCOUNTER — HOSPITAL ENCOUNTER (OUTPATIENT)
Dept: PHYSICAL THERAPY | Age: 53
Setting detail: THERAPIES SERIES
Discharge: HOME | End: 2023-08-04
Payer: COMMERCIAL

## 2023-08-04 DIAGNOSIS — M54.6 CHRONIC MIDLINE THORACIC BACK PAIN: Primary | ICD-10-CM

## 2023-08-04 DIAGNOSIS — G89.29 CHRONIC MIDLINE THORACIC BACK PAIN: Primary | ICD-10-CM

## 2023-08-04 PROCEDURE — 97161 PT EVAL LOW COMPLEX 20 MIN: CPT | Performed by: PHYSICAL THERAPIST

## 2023-08-04 PROCEDURE — 97110 THERAPEUTIC EXERCISES: CPT | Performed by: PHYSICAL THERAPIST

## 2023-08-04 NOTE — FLOWSHEET NOTE
[facility], [address] [facility], [address] [facility]  Phone: [phone]  Fax [phone]                  Physical Therapy: Daily Note   Patient: April Fowler (1 y.o. male)   Treatment Date: 2023   :  1970 MRN: [id number]   Visit #: 1   Auth needed? [x]  Yes    []  No   Amount authorized: 8  Visit Limit:  Insurance: Payor: Judie Chad / Plan: [employer] / Product Type: *No Product type* /   Insurance ID: [id number] - (Worker's Comp)  Secondary Insurance (if applicable):    Treatment Diagnosis:  ,     ICD-10-CM    1. Chronic midline thoracic back pain  M54.6     G89.29          Medical Diagnosis: Strain of muscle(s) and tendon(s) of the rotator cuff of right shoulder, initial encounter [S46.011A]  Contusion of left front wall of thorax, initial encounter [S20.212A]  Strain of muscle, fascia and tendon at neck level, initial encounter [S16. 1XXA]     Referring Physician: Sanjiv Hawkins    PCP: No primary care provider on file. Plan of Care signed? []  Yes [x]  No  Latex Allergy? [] Y   [x] N      Pacemaker? [] Y   [x] N   Preferred Language for Healthcare:   [x] English       [] other:                Date of Patient follow up with Physician:      Progress Report: EVAL today  Progress report due (10 Rx/or 30 days whichever is less): 4/3/78   Recertification due (POC duration/ or 90 days whichever is less): 10/4/23     RESTRICTIONS/PRECAUTIONS: NA    SUBJECTIVE EXAMINATION   Pain level:  6 /10    Patient Report/Comments:     OBJECTIVE EXAMINATION   Observation:     Test measurements:     Exercise / Equipment / Intervention Sets / Yesenia Ramón / Resistance Comments / Loli Speaker 3\" x10     SBS 3\" x10     No-money 3\" x 10                                                                               Education/Home Exercise Program: Patient HEP program created electronically.   Refer to

## 2023-08-07 ENCOUNTER — HOSPITAL ENCOUNTER (OUTPATIENT)
Dept: PHYSICAL THERAPY | Age: 53
Setting detail: THERAPIES SERIES
Discharge: HOME | End: 2023-08-07
Payer: COMMERCIAL

## 2023-08-07 NOTE — PROGRESS NOTES
[facility], [address] [place] [address], [address] [facility]  Phone: [phone]  Fax [phone]     Physical Therapy  Cancellation/No-show Note  Patient Name:  Rebecca Walker  :  1970   Date:  2023  Cancelled visits to date: 1  No-shows to date: 0    For today's appointment patient:  []  Cancelled  []  Rescheduled appointment  []  No-show     Reason given by patient:  []  Patient ill  []  Conflicting appointment  []  No transportation    []  Conflict with work  [x]  No reason given  []  Other:     Comments:     Electronically signed by:   Vandana Mederos PT

## 2023-08-08 ENCOUNTER — HOSPITAL ENCOUNTER (OUTPATIENT)
Dept: PHYSICAL THERAPY | Age: 53
Setting detail: THERAPIES SERIES
Discharge: HOME | End: 2023-08-08
Payer: COMMERCIAL

## 2023-08-08 PROCEDURE — 97110 THERAPEUTIC EXERCISES: CPT

## 2023-08-08 PROCEDURE — 97140 MANUAL THERAPY 1/> REGIONS: CPT

## 2023-08-08 NOTE — FLOWSHEET NOTE
[facility] [address] [facility], [address] [place] [address]  Phone: [phone]  Fax [phone]                  Physical Therapy: Daily Note   Patient: Benitez Diaz (38 y.o. male)   Treatment Date: 2023   :  1970 MRN: [id number]   Visit #: 2   Auth needed? [x]  Yes    []  No   Amount authorized: 8  Visit Limit:  Insurance: Payor: Kitty Gerardo / Plan: Delayne Gerardo / Product Type: *No Product type* /   Insurance ID: [id number] - (Worker's Comp)  Secondary Insurance (if applicable):    Treatment Diagnosis:  ,     ICD-10-CM    1. Chronic midline thoracic back pain  M54.6     G89.29          Medical Diagnosis: Strain of muscle(s) and tendon(s) of the rotator cuff of right shoulder, initial encounter [S46.011A]  Contusion of left front wall of thorax, initial encounter [S20.212A]  Strain of muscle, fascia and tendon at neck level, initial encounter [S16. 1XXA]     Referring Physician: Fatou Amaro    PCP: No primary care provider on file. Plan of Care signed? []  Yes [x]  No  Latex Allergy? [] Y   [x] N      Pacemaker? [] Y   [x] N   Preferred Language for Healthcare:   [x] English       [] other:                Date of Patient follow up with Physician:      Progress Report: EVAL today  Progress report due (10 Rx/or 30 days whichever is less): 17   Recertification due (POC duration/ or 90 days whichever is less): 10/4/23     RESTRICTIONS/PRECAUTIONS: NA    SUBJECTIVE EXAMINATION   Pain level:  6 /10    Patient Report/Comments: PT notes that he has been having dizziness, lightheadedness and dizziness that has been going on for a couple months. Also reports that he is still having pain below his shoulder blades and that his neck bothers him \"some. \"    OBJECTIVE EXAMINATION   Observation:     Test measurements:     Exercise / Equipment / Intervention Sets / Reps / Resistance Comments / Cueing HEP

## 2023-08-11 ENCOUNTER — HOSPITAL ENCOUNTER (OUTPATIENT)
Dept: PHYSICAL THERAPY | Age: 53
Setting detail: THERAPIES SERIES
Discharge: HOME | End: 2023-08-11
Payer: COMMERCIAL

## 2023-08-11 PROCEDURE — 97110 THERAPEUTIC EXERCISES: CPT

## 2023-08-11 PROCEDURE — 97140 MANUAL THERAPY 1/> REGIONS: CPT

## 2023-08-11 NOTE — FLOWSHEET NOTE
[facility] [address] [facility], [address] [facility]  Phone: [phone]  Fax [phone]                  Physical Therapy: Daily Note   Patient: Rosalina Rapp (14 y.o. male)   Treatment Date: 2023   :  1970 MRN: [id number]   Visit #: 3   Auth needed? [x]  Yes    []  No   Amount authorized: 8  Visit Limit:  Insurance: Payor: Dilshad Patel / Plan: Dilshad Patel / Product Type: *No Product type* /   Insurance ID: [id number] - (Worker's Comp)  Secondary Insurance (if applicable):    Treatment Diagnosis:  ,     ICD-10-CM    1. Chronic midline thoracic back pain  M54.6     G89.29          Medical Diagnosis: Strain of muscle(s) and tendon(s) of the rotator cuff of right shoulder, initial encounter [S46.011A]  Contusion of left front wall of thorax, initial encounter [S20.212A]  Strain of muscle, fascia and tendon at neck level, initial encounter [S16. 1XXA]     Referring Physician: Joshua Stahl    PCP: No primary care provider on file. Plan of Care signed? []  Yes [x]  No  Latex Allergy? [] Y   [x] N      Pacemaker? [] Y   [x] N   Preferred Language for Healthcare:   [x] English       [] other:                Date of Patient follow up with Physician:      Progress Report:   Progress report due (10 Rx/or 30 days whichever is less): 33   Recertification due (POC duration/ or 90 days whichever is less): 10/4/23     RESTRICTIONS/PRECAUTIONS: NA    SUBJECTIVE EXAMINATION   Pain level:  5 /10 at rest, 8/10 at worst    Patient Report/Comments: Pt notes pain has been waking him up at night. He reports being very sore after last session with manipulation. He tried to mow the grass last night and was unable to finish 2/2 pain. OBJECTIVE EXAMINATION   Observation: pt walks with antalgic gait pattern and wearing R knee brace.      Test measurements:     Exercise / Equipment / Intervention Sets / [place] Mantilla /

## 2023-08-14 ENCOUNTER — HOSPITAL ENCOUNTER (OUTPATIENT)
Dept: PHYSICAL THERAPY | Age: 53
Setting detail: THERAPIES SERIES
Discharge: HOME | End: 2023-08-14
Payer: COMMERCIAL

## 2023-08-14 PROCEDURE — 97110 THERAPEUTIC EXERCISES: CPT | Performed by: PHYSICAL THERAPIST

## 2023-08-14 PROCEDURE — 97140 MANUAL THERAPY 1/> REGIONS: CPT | Performed by: PHYSICAL THERAPIST

## 2023-08-14 NOTE — FLOWSHEET NOTE
Yes  [] No    GOALS     Patient stated goal: Feel normal again  [x] Progressing: [] Met: [] Not Met: [] Adjusted     Therapist goals for Patient:   Short Term Goals: To be achieved in: 2 weeks  Independent in HEP and progression per patient tolerance, in order to progress toward full function and prevent re-injury. [x] Progressing: [] Met: [] Not Met: [] Adjusted  Patient will have a decrease in pain to facilitate improvement in movement, function, and ADLs as indicated by functional deficits. [x] Progressing: [] Met: [] Not Met: [] Adjusted     Long Term Goals: To be achieved in: 8 weeks  Disability index score of 35% or less as measure by Oswestry (Thoracic) to assist with reaching prior level of function. [x] Progressing: [] Met: [] Not Met: [] Adjusted  Patient will demonstrate increased AROM to WNL, good Lumbar Spine mobility, good hip and knee ROM to allow for proper joint functioning as indicated by patients Functional Deficits. [x] Progressing: [] Met: [] Not Met: [] Adjusted  Patient will demonstrate an increase in proximal hip strength and core activation to allow for proper functional mobility as indicated by patients Functional Deficits  [x] Progressing: [] Met: [] Not Met: [] Adjusted  Patient will return to Lifting without increased symptoms or restriction. [x] Progressing: [] Met: [] Not Met: [] Adjusted    Overall Progression Towards Functional goals/ Treatment Progress Update:  [x] Patient is progressing as expected towards functional goals listed. [] Progression is slowed due to complexities/Impairments listed. [] Progression has been slowed due to co-morbidities.   [] Plan just implemented, too soon (<30days) to assess goals progression   [] Goals require adjustment due to lack of progress  [] Patient is not progressing as expected and requires additional follow up with physician  [] Other:     [address] Code Treatment Minutes: 45   Total Treatment Minutes: 45

## 2023-08-18 ENCOUNTER — HOSPITAL ENCOUNTER (OUTPATIENT)
Dept: PHYSICAL THERAPY | Age: 53
Setting detail: THERAPIES SERIES
Discharge: HOME | End: 2023-08-18
Payer: COMMERCIAL

## 2023-08-18 PROCEDURE — 97140 MANUAL THERAPY 1/> REGIONS: CPT | Performed by: PHYSICAL THERAPIST

## 2023-08-18 PROCEDURE — G0283 ELEC STIM OTHER THAN WOUND: HCPCS | Performed by: PHYSICAL THERAPIST

## 2023-08-18 PROCEDURE — 97110 THERAPEUTIC EXERCISES: CPT | Performed by: PHYSICAL THERAPIST

## 2023-08-18 NOTE — FLOWSHEET NOTE
Chin tucks 5\" x10  x   Supine cervical rotation X10 B  x   SBS 5\" x10     No-money 3\" x 10 YTB x   Supine S HABD Blue TB    Open book stretch   X    Open book sidelying stretch   X   Standing shoulder extension  Red TB 2 x 10  Cues for technique     Standing rows  RTB 2 x 10 Cues for technique          Horz ABD X10 RTB     TYI X5 1#           MH Thoracic spine x10'                   Manual Therapy:  Prone PA G1-4 mobs, sp and tp thoracic spine x 15', cervical G1-2 PA and side glide mobs x 12', Cerv STM x5'    Education/Home Exercise Program: Patient HEP program created electronically. Refer to [address] [place] access code: Access Code: [email or web address]    ASSESSMENT     Today's Assessment: pt tolerates standing therex with min cues for technique this date. Medical Necessity Documentation:  I certify that this patient meets the below criteria necessary for medical necessity for care and/or justification of therapy services: The patient has a musculoskeletal condition(s) with a corresponding ICD-10 code that is of complexity and severity that require skilled therapeutic intervention. This has a direct and significant impact on the need for therapy and significantly impacts the rate of recovery. The patient has generalized musculoskeletal conditions or a condition affecting multiple sites that will have a direct impact on the rate of recovery  The patient has associated co-morbidities along with primary diagnosis which significantly impact the rate of recovery and contribute to complexities that require skilled therapeutic intervention  The patient had a prior episode of outpatient therapy during this calendar year for a different condition. Current diagnosis requires skilled therapeutic intervention.     Treatment/Activity Tolerance:  [x] Patient tolerated treatment well [] Patient limited by fatique  [] Patient limited by pain  [] Patient limited by other medical complications  [] Other:     Return to Play:

## 2023-08-21 ENCOUNTER — HOSPITAL ENCOUNTER (OUTPATIENT)
Dept: PHYSICAL THERAPY | Age: 53
Setting detail: THERAPIES SERIES
Discharge: HOME | End: 2023-08-21
Payer: COMMERCIAL

## 2023-08-21 PROCEDURE — G0283 ELEC STIM OTHER THAN WOUND: HCPCS | Performed by: PHYSICAL THERAPIST

## 2023-08-21 PROCEDURE — 97140 MANUAL THERAPY 1/> REGIONS: CPT | Performed by: PHYSICAL THERAPIST

## 2023-08-21 PROCEDURE — 97110 THERAPEUTIC EXERCISES: CPT | Performed by: PHYSICAL THERAPIST

## 2023-08-21 NOTE — FLOWSHEET NOTE
[facility], [address] [place] [facility], [address] [facility]  Phone: [phone]  Fax [phone]                  Physical Therapy: Daily Note   Patient: Do Camarena (75 y.o. male)   Treatment Date: 2023   :  1970 MRN: [id number]   Visit #: 6   Auth needed? [x]  Yes    []  No   Amount authorized: 8  Visit Limit:  Insurance: Payor: Natalieimpok / Plan: [employer] / Product Type: *No Product type* /   Insurance ID: [id number] - (Worker's Comp)  Secondary Insurance (if applicable):    Treatment Diagnosis:  ,     ICD-10-CM    1. Chronic midline thoracic back pain  M54.6     G89.29          Medical Diagnosis: Strain of muscle(s) and tendon(s) of the rotator cuff of right shoulder, initial encounter [S46.011A]  Contusion of left front wall of thorax, initial encounter [S20.212A]  Strain of muscle, fascia and tendon at neck level, initial encounter [S16. 1XXA]     Referring Physician: Jacoby Hodges    PCP: No primary care provider on file. Plan of Care signed? []  Yes [x]  No  Latex Allergy? [] Y   [x] N      Pacemaker? [] Y   [x] N   Preferred Language for Healthcare:   [x] English       [] other:                Date of Patient follow up with Physician:      Progress Report:   Progress report due (10 Rx/or 30 days whichever is less): 18   Recertification due (POC duration/ or 90 days whichever is less): 10/4/23     RESTRICTIONS/PRECAUTIONS: NA    SUBJECTIVE EXAMINATION   Pain level:  4/10 today    Patient Report/Comments: Nothing to note from the weekend. Did have one bought of pain from bending over.     OBJECTIVE EXAMINATION   Observation:    Test measurements:     Exercise / Equipment / Intervention Sets / Lad[place] Petrin / Resistance Comments / Cueing HEP   Prone prop 5x10\"     Chin tucks 5\" x10  x   Supine cervical rotation  x   SBS 5\" x10     No-money 3\" x 10 YTB x   Foam roll protocol x8'     Supine S HABD

## 2023-08-25 ENCOUNTER — HOSPITAL ENCOUNTER (OUTPATIENT)
Dept: PHYSICAL THERAPY | Age: 53
Setting detail: THERAPIES SERIES
Discharge: HOME | End: 2023-08-25
Payer: COMMERCIAL

## 2023-08-25 PROCEDURE — 97140 MANUAL THERAPY 1/> REGIONS: CPT

## 2023-08-25 PROCEDURE — 97112 NEUROMUSCULAR REEDUCATION: CPT

## 2023-08-25 PROCEDURE — G0283 ELEC STIM OTHER THAN WOUND: HCPCS

## 2023-08-25 PROCEDURE — 97110 THERAPEUTIC EXERCISES: CPT

## 2023-08-25 NOTE — FLOWSHEET NOTE
[facility], [address] [place] [facility], [address] [facility]  Phone: [phone]  Fax [phone]                  Physical Therapy: Daily Note   Patient: Denisa Marshall (89 y.o. male)   Treatment Date: 2023   :  1970 MRN: [id number]   Visit #: 7  Auth needed? [x]  Yes    []  No   Amount authorized: 8  Visit Limit:  Insurance: Payor: Fausto Lightning / Plan: [employer] / Product Type: *No Product type* /   Insurance ID: [id number] - (Worker's Comp)  Secondary Insurance (if applicable):    Treatment Diagnosis:  ,     ICD-10-CM    1. Chronic midline thoracic back pain  M54.6     G89.29          Medical Diagnosis: Strain of muscle(s) and tendon(s) of the rotator cuff of right shoulder, initial encounter [S46.011A]  Contusion of left front wall of thorax, initial encounter [S20.212A]  Strain of muscle, fascia and tendon at neck level, initial encounter [S16. 1XXA]     Referring Physician: Edilson Sheridan    PCP: No primary care provider on file. Plan of Care signed? []  Yes [x]  No  Latex Allergy? [] Y   [x] N      Pacemaker? [] Y   [x] N   Preferred Language for Healthcare:   [x] English       [] other:                Date of Patient follow up with Physician:      Progress Report:   Progress report due (10 Rx/or 30 days whichever is less): 52   Recertification due (POC duration/ or 90 days whichever is less): 10/4/23     RESTRICTIONS/PRECAUTIONS: NA    SUBJECTIVE EXAMINATION   Pain level:  4/10 today    Patient Report/Comments: Pt continues to c/o midback and neck pain daily. Pt sees his MD this afternoon and thinks they will order an MRI for the back. Denies radicular symptoms. Carrying things in front of him is painful.       OBJECTIVE EXAMINATION   Observation:    Test measurements:     Exercise / Equipment / Intervention Sets / Reps / Resistance Comments / Cueing HEP   Prone prop      Chin tucks 5\" x10  x

## 2023-08-28 ENCOUNTER — APPOINTMENT (OUTPATIENT)
Dept: PHYSICAL THERAPY | Age: 53
End: 2023-08-28
Payer: COMMERCIAL

## 2023-08-30 ENCOUNTER — APPOINTMENT (OUTPATIENT)
Dept: PHYSICAL THERAPY | Age: 53
End: 2023-08-30
Payer: COMMERCIAL

## 2023-08-31 ENCOUNTER — TRANSCRIBE ORDERS (OUTPATIENT)
Dept: ADMINISTRATIVE | Age: 53
End: 2023-08-31

## 2023-08-31 DIAGNOSIS — S20.212A CONTUSION OF LEFT FRONT WALL OF THORAX, INITIAL ENCOUNTER: ICD-10-CM

## 2023-08-31 DIAGNOSIS — S80.01XA CONTUSION OF RIGHT KNEE, INITIAL ENCOUNTER: ICD-10-CM

## 2023-08-31 DIAGNOSIS — S16.1XXA STRAIN OF MUSCLE, FASCIA AND TENDON AT NECK LEVEL, INITIAL ENCOUNTER: Primary | ICD-10-CM

## 2023-08-31 DIAGNOSIS — S46.911A STRAIN OF UNSPECIFIED MUSCLE, FASCIA AND TENDON AT SHOULDER AND UPPER ARM LEVEL, RIGHT ARM, INITIAL ENCOUNTER: ICD-10-CM

## 2023-08-31 DIAGNOSIS — S50.02XA CONTUSION OF LEFT ELBOW, INITIAL ENCOUNTER: ICD-10-CM

## 2023-09-01 ENCOUNTER — HOSPITAL ENCOUNTER (OUTPATIENT)
Dept: PHYSICAL THERAPY | Age: 53
Setting detail: THERAPIES SERIES
Discharge: HOME | End: 2023-09-01
Payer: COMMERCIAL

## 2023-09-01 PROCEDURE — 97140 MANUAL THERAPY 1/> REGIONS: CPT | Performed by: PHYSICAL THERAPIST

## 2023-09-01 PROCEDURE — 97110 THERAPEUTIC EXERCISES: CPT | Performed by: PHYSICAL THERAPIST

## 2023-09-01 NOTE — FLOWSHEET NOTE
[address] [facility], [address] [facility] [address] [facility]  Phone: [phone]  Fax [phone]                  Physical Therapy: Daily Note   Patient: Divya Quiroz (60 y.o. male)   Treatment Date: 2023   :  1970 MRN: [id number]   Visit #: 8   Auth needed? [x]  Yes    []  No   Amount authorized: 8  Visit Limit:  Insurance: Payor: Pavel Apt / Plan: Pavel Apt / Product Type: *No Product type* /   Insurance ID: [id number] - (Worker's Comp)  Secondary Insurance (if applicable):    Treatment Diagnosis:  ,     ICD-10-CM    1. Chronic midline thoracic back pain  M54.6     G89.29          Medical Diagnosis: Strain of muscle(s) and tendon(s) of the rotator cuff of right shoulder, initial encounter [S46.011A]  Contusion of left front wall of thorax, initial encounter [S20.212A]  Strain of muscle, fascia and tendon at neck level, initial encounter [S16. 1XXA]     Referring Physician: Tyshawn De La Cruz    PCP: No primary care provider on file. Plan of Care signed? []  Yes [x]  No  Latex Allergy? [] Y   [x] N      Pacemaker? [] Y   [x] N   Preferred Language for Healthcare:   [x] English       [] other:                Date of Patient follow up with Physician:      Progress Report:   Progress report due (10 Rx/or 30 days whichever is less):    Recertification due (POC duration/ or 90 days whichever is less): 10/4/23     RESTRICTIONS/PRECAUTIONS: NA    SUBJECTIVE EXAMINATION   Pain level:  4/10 today    Patient Report/Comments: Getting MRI on . After last session was really sore and could not turn head. Took 2-3 days to recover.     OBJECTIVE EXAMINATION   Observation:    Test measurements:  Thoracic Oswestry:  59  NDI: 50    Exercise / Equipment / Intervention Sets / Reps / Resistance Comments / Cueing HEP   Prone prop 5x5\"     Chin tucks  x   Supine cervical rotation  x   SBS     No-money 3\" x 15 RTB x

## 2023-09-11 ENCOUNTER — HOSPITAL ENCOUNTER (OUTPATIENT)
Dept: MRI IMAGING | Age: 53
Discharge: HOME | End: 2023-09-11
Attending: EMERGENCY MEDICINE
Payer: COMMERCIAL

## 2023-09-11 DIAGNOSIS — S80.01XA CONTUSION OF RIGHT KNEE, INITIAL ENCOUNTER: ICD-10-CM

## 2023-09-11 DIAGNOSIS — S50.02XA CONTUSION OF LEFT ELBOW, INITIAL ENCOUNTER: ICD-10-CM

## 2023-09-11 DIAGNOSIS — S46.911A STRAIN OF UNSPECIFIED MUSCLE, FASCIA AND TENDON AT SHOULDER AND UPPER ARM LEVEL, RIGHT ARM, INITIAL ENCOUNTER: ICD-10-CM

## 2023-09-11 DIAGNOSIS — S20.212A CONTUSION OF LEFT FRONT WALL OF THORAX, INITIAL ENCOUNTER: ICD-10-CM

## 2023-09-11 DIAGNOSIS — S16.1XXA STRAIN OF MUSCLE, FASCIA AND TENDON AT NECK LEVEL, INITIAL ENCOUNTER: ICD-10-CM

## 2023-09-11 PROCEDURE — 72141 MRI NECK SPINE W/O DYE: CPT

## 2023-09-11 PROCEDURE — 72146 MRI CHEST SPINE W/O DYE: CPT

## 2023-11-27 ENCOUNTER — HOSPITAL ENCOUNTER (OUTPATIENT)
Dept: VASCULAR LAB | Age: 53
Discharge: HOME | End: 2023-11-27
Attending: ORTHOPAEDIC SURGERY
Payer: COMMERCIAL

## 2023-11-27 DIAGNOSIS — M79.89 CALF SWELLING: ICD-10-CM

## 2023-11-27 DIAGNOSIS — Z96.651 STATUS POST TOTAL KNEE REPLACEMENT, RIGHT: ICD-10-CM

## 2023-11-27 DIAGNOSIS — M79.661 RIGHT CALF PAIN: ICD-10-CM

## 2023-11-27 PROCEDURE — 93971 EXTREMITY STUDY: CPT
